# Patient Record
Sex: FEMALE | Race: WHITE | URBAN - METROPOLITAN AREA
[De-identification: names, ages, dates, MRNs, and addresses within clinical notes are randomized per-mention and may not be internally consistent; named-entity substitution may affect disease eponyms.]

---

## 2018-09-01 ENCOUNTER — OFFICE VISIT (OUTPATIENT)
Dept: URGENT CARE | Facility: URGENT CARE | Age: 48
End: 2018-09-01
Payer: COMMERCIAL

## 2018-09-01 VITALS
SYSTOLIC BLOOD PRESSURE: 120 MMHG | HEART RATE: 77 BPM | DIASTOLIC BLOOD PRESSURE: 74 MMHG | WEIGHT: 119 LBS | OXYGEN SATURATION: 97 % | TEMPERATURE: 98.7 F

## 2018-09-01 DIAGNOSIS — R09.81 NASAL CONGESTION: Primary | ICD-10-CM

## 2018-09-01 DIAGNOSIS — R09.82 POST-NASAL DRAINAGE: ICD-10-CM

## 2018-09-01 PROCEDURE — 99203 OFFICE O/P NEW LOW 30 MIN: CPT | Performed by: PHYSICIAN ASSISTANT

## 2018-09-01 RX ORDER — VENLAFAXINE HYDROCHLORIDE 150 MG/1
CAPSULE, EXTENDED RELEASE ORAL
COMMUNITY
Start: 2018-01-11

## 2018-09-01 RX ORDER — DROSPIRENONE AND ETHINYL ESTRADIOL 0.02-3(28)
KIT ORAL
COMMUNITY
Start: 2016-12-16

## 2018-09-01 RX ORDER — FLUTICASONE PROPIONATE 50 MCG
2 SPRAY, SUSPENSION (ML) NASAL DAILY
Qty: 1 BOTTLE | Refills: 11 | Status: SHIPPED | OUTPATIENT
Start: 2018-09-01

## 2018-09-01 ASSESSMENT — ENCOUNTER SYMPTOMS
SINUS PAIN: 0
CHILLS: 0
FEVER: 0
SORE THROAT: 0
COUGH: 0
DIARRHEA: 0
SINUS PRESSURE: 0
VOMITING: 0

## 2018-09-01 NOTE — MR AVS SNAPSHOT
"              After Visit Summary   2018    Radha Thornton    MRN: [id number]           Patient Information     Date Of Birth          1970        Visit Information        Provider Department      2018 3:05 PM Crystal Mullins PA-C [facility]        Today's Diagnoses     Nasal congestion    -  1    Post-nasal drainage           Follow-ups after your visit        Who to contact     If you have questions or need follow up information about today's clinic visit or your schedule please contact [facility] directly at [phone].  Normal or non-critical lab and imaging results will be communicated to you by [employer], letter or phone within 4 business days after the clinic has received the results. If you do not hear from us within 7 days, please contact the clinic through [employer]Star[employer] or phone. If you have a critical or abnormal lab result, we will notify you by phone as soon as possible.  Submit refill requests through [employer] or call your pharmacy and they will forward the refill request to us. Please allow 3 business days for your refill to be completed.          Additional Information About Your Visit        MyChart Information     [employer] lets you send messages to your doctor, view your test results, renew your prescriptions, schedule appointments and more. To sign up, go to [email or web address] . Click on \"Log in\" on the left side of the screen, which will take you to the Welcome page. Then click on \"Sign up Now\" on the right side of the page.     You will be asked to enter the access code listed below, as well as some personal information. Please follow the directions to create your username and password.     Your access code is: [email or web address]  Expires: 2018  3:51 PM     Your access code will  in 90 days. If you need help or a new code, please call your [place] clinic or [phone].        Care EveryWhere ID     This is your Care EveryWhere " ID. This could be used by other organizations to access your [place] medical records  [id number]        Your Vitals Were     Pulse Temperature Pulse Oximetry             77 98.7  F (37.1  C) (Oral) 97%          Blood Pressure from Last 3 Encounters:   09/01/18 120/74    Weight from Last 3 Encounters:   09/01/18 119 lb (54 kg)              Today, you had the following     No orders found for display         Today's Medication Changes          These changes are accurate as of 9/1/18  3:51 PM.  If you have any questions, ask your nurse or doctor.               Start taking these medicines.        Dose/Directions    fluticasone 50 MCG/ACT spray   Commonly known as:  FLONASE   Used for:  Nasal congestion, Post-nasal drainage   Started by:  Crystal Mullins PA-C        Dose:  2 spray   Spray 2 sprays into both nostrils daily   Quantity:  1 Bottle   Refills:  11            Where to get your medicines      These medications were sent to [facility] [address] [place], [place] - [address] AT [facility] [address] [facility]  [address] [facility], [facility] [address]     Phone:  [phone]     fluticasone 50 MCG/ACT spray                Primary Care Provider Fax #    Physician No Ref-Primary [phone]       No address on file        Equal Access to Services     ROYA CURTIS AH: Hadii patrick barker hadasho Soomaali, waaxda luqadaha, qaybta kaalmada adeegyada, zbigniew em hayanusha maguire. So [facility] [phone].    ATENCIÓN: Si habla español, tiene a vasquez disposición servicios gratuitos de asistencia lingüística. Llame al [phone].    We comply with applicable federal civil rights laws and [place] laws. We do not discriminate on the basis of race, color, national origin, age, disability, sex, sexual orientation, or gender identity.            Thank you!     Thank you for choosing [facility]  for your care. Our goal is always to provide you with excellent care.  Hearing back from our patients is one way we can continue to improve our services. Please take a few minutes to complete the written survey that you may receive in the mail after your visit with us. Thank you!             Your Updated Medication List - Protect others around you: Learn how to safely use, store and throw away your medicines at www.disposemymeds.org.          This list is accurate as of 9/1/18  3:51 PM.  Always use your most recent med list.                   Brand Name Dispense Instructions for use Diagnosis    fluticasone 50 MCG/ACT spray    FLONASE    1 Bottle    Spray 2 sprays into both nostrils daily    Nasal congestion, Post-nasal drainage       LORYNA 3-0.02 MG per tablet   Generic drug:  drospirenone-ethinyl estradiol           venlafaxine 150 MG 24 hr capsule    EFFEXOR-XR

## 2018-09-01 NOTE — PROGRESS NOTES
SUBJECTIVE:   Radha Thornton is a 47 year old female presenting with a chief complaint of   Chief Complaint   Patient presents with     Urgent Care     Sinus Problem     Started with cold 2 weeks ago, and now lots of drainage and sinue pressure       She is a new patient of [place].    URI Adult  Onset of symptoms was 1 week(s) ago.  Course of illness is improving.    Severity moderate  Current and Associated symptoms: stuffy nose and post nasal drainage started back yesterday.  Treatment measures tried include None tried.  Predisposing factors include None.  She notes initial symptoms starting a week ago.   Symptoms completely improved then restarted yesterday again.   No treatment tried prior to arrival.      Review of Systems   Constitutional: Negative for chills and fever.   HENT: Positive for postnasal drip. Negative for sinus pain, sinus pressure and sore throat.    Respiratory: Negative for cough.    Gastrointestinal: Negative for diarrhea and vomiting.       History reviewed. No pertinent past medical history.  History reviewed. No pertinent family history.  Current Outpatient Prescriptions   Medication Sig Dispense Refill     drospirenone-ethinyl estradiol (LORYNA) 3-0.02 MG per tablet        fluticasone (FLONASE) 50 MCG/ACT spray Spray 2 sprays into both nostrils daily 1 Bottle 11     venlafaxine (EFFEXOR-XR) 150 MG 24 hr capsule        Social History   Substance Use Topics     Smoking status: Never Smoker     Smokeless tobacco: Never Used     Alcohol use Not on file       OBJECTIVE  /74  Pulse 77  Temp 98.7  F (37.1  C) (Oral)  Wt 119 lb (54 kg)  SpO2 97%    Physical Exam   Constitutional: She appears well-developed and well-nourished. No distress.   HENT:   Head: Normocephalic and atraumatic.   Right Ear: Tympanic membrane and external ear normal.   Left Ear: Tympanic membrane and external ear normal.   Nose: Nose normal.   Mouth/Throat: Oropharynx is clear and moist.   Nasal passages with  mild congestion   Eyes: Conjunctivae are normal.   Neck: Normal range of motion. Neck supple.   Cardiovascular: Regular rhythm and normal heart sounds.    Pulmonary/Chest: Effort normal and breath sounds normal. No respiratory distress.   Neurological: She is alert.   Skin: Skin is warm and dry. No rash noted.   Psychiatric: She has a normal mood and affect.       Labs:  No results found for this or any previous visit (from the past 24 hour(s)).        ASSESSMENT:      ICD-10-CM    1. Nasal congestion R09.81 fluticasone (FLONASE) 50 MCG/ACT spray   2. Post-nasal drainage R09.82 fluticasone (FLONASE) 50 MCG/ACT spray        Medical Decision Making:    Differential Diagnosis:  URI Adult/Peds:  Sinusitis, Viral pharyngitis, Viral syndrome and Viral upper respiratory illness    Serious Comorbid Conditions:  Adult:  None    PLAN:    Post nasal drainage: Flonase nasal spray. Supportive cares. Follow up if any worsening symptoms. She agrees.    Followup:    If not improving or if condition worsens, follow up with your Primary Care Provider